# Patient Record
Sex: FEMALE | URBAN - METROPOLITAN AREA
[De-identification: names, ages, dates, MRNs, and addresses within clinical notes are randomized per-mention and may not be internally consistent; named-entity substitution may affect disease eponyms.]

---

## 2024-10-02 ENCOUNTER — TELEPHONE (OUTPATIENT)
Age: 29
End: 2024-10-02

## 2024-10-02 NOTE — TELEPHONE ENCOUNTER
Patient's friend called to make appointment for her friend who has bad anxiety. Writer asked friend to have patient give me a verbal since this is a new chart and there is no communication consent. Friend (Raysa) contacted patient via face time and patient gave writer permission to speak with friend on her behalf. Writer send communication consent to patient to add her friend for future. Pt has been added to the proper wait list for talk therapy services. Patient will give insurance information at the time of scheduling. Patient has medicaid.

## 2025-02-24 ENCOUNTER — TELEPHONE (OUTPATIENT)
Age: 30
End: 2025-02-24

## 2025-02-24 NOTE — TELEPHONE ENCOUNTER
Patient on wait list for talk therapy services. Writer reached out to verify if Pt is still interested in service, and if would like to remain on [facility]. Pt would like to remain on [facility].     Insurance verified in [employer]:    Recipient ID: [id number]     [facility]

## 2025-06-12 ENCOUNTER — TELEPHONE (OUTPATIENT)
Age: 30
End: 2025-06-12